# Patient Record
Sex: MALE
[De-identification: names, ages, dates, MRNs, and addresses within clinical notes are randomized per-mention and may not be internally consistent; named-entity substitution may affect disease eponyms.]

---

## 2017-02-03 ENCOUNTER — APPOINTMENT (OUTPATIENT)
Dept: CARDIOLOGY | Facility: CLINIC | Age: 52
End: 2017-02-03

## 2017-03-02 ENCOUNTER — TRANSCRIPTION ENCOUNTER (OUTPATIENT)
Age: 52
End: 2017-03-02

## 2017-12-20 ENCOUNTER — RX RENEWAL (OUTPATIENT)
Age: 52
End: 2017-12-20

## 2018-03-25 ENCOUNTER — RX RENEWAL (OUTPATIENT)
Age: 53
End: 2018-03-25

## 2019-01-17 ENCOUNTER — RX RENEWAL (OUTPATIENT)
Age: 54
End: 2019-01-17

## 2019-04-23 ENCOUNTER — RX RENEWAL (OUTPATIENT)
Age: 54
End: 2019-04-23

## 2019-04-23 RX ORDER — LOSARTAN POTASSIUM AND HYDROCHLOROTHIAZIDE 12.5; 1 MG/1; MG/1
100-12.5 TABLET ORAL
Qty: 90 | Refills: 0 | Status: ACTIVE | COMMUNITY
Start: 2017-12-20 | End: 1900-01-01

## 2025-06-03 ENCOUNTER — APPOINTMENT (OUTPATIENT)
Dept: ELECTROPHYSIOLOGY | Facility: CLINIC | Age: 60
End: 2025-06-03

## 2025-06-03 ENCOUNTER — NON-APPOINTMENT (OUTPATIENT)
Age: 60
End: 2025-06-03

## 2025-06-03 VITALS
HEART RATE: 51 BPM | HEIGHT: 71 IN | SYSTOLIC BLOOD PRESSURE: 147 MMHG | BODY MASS INDEX: 35.84 KG/M2 | DIASTOLIC BLOOD PRESSURE: 66 MMHG | WEIGHT: 256 LBS | OXYGEN SATURATION: 98 %

## 2025-06-03 DIAGNOSIS — E11.9 TYPE 2 DIABETES MELLITUS W/OUT COMPLICATIONS: ICD-10-CM

## 2025-06-03 DIAGNOSIS — I44.2 ATRIOVENTRICULAR BLOCK, COMPLETE: ICD-10-CM

## 2025-06-03 DIAGNOSIS — G47.30 SLEEP APNEA, UNSPECIFIED: ICD-10-CM

## 2025-06-03 PROCEDURE — G2211 COMPLEX E/M VISIT ADD ON: CPT | Mod: NC

## 2025-06-03 PROCEDURE — 99203 OFFICE O/P NEW LOW 30 MIN: CPT

## 2025-06-03 PROCEDURE — 93000 ELECTROCARDIOGRAM COMPLETE: CPT

## 2025-06-03 RX ORDER — OLMESARTAN MEDOXOMIL 40 MG/1
40 TABLET, FILM COATED ORAL DAILY
Refills: 0 | Status: ACTIVE | COMMUNITY

## 2025-06-03 RX ORDER — GLIPIZIDE 10 MG/1
10 TABLET ORAL DAILY
Refills: 0 | Status: ACTIVE | COMMUNITY

## 2025-06-03 RX ORDER — MAGNESIUM OXIDE/MAG AA CHELATE 300 MG
CAPSULE ORAL
Refills: 0 | Status: ACTIVE | COMMUNITY

## 2025-06-03 RX ORDER — CA/D3/MAG OX/ZINC/COP/MANG/BOR 600 MG-800
250 MCG TABLET,CHEWABLE ORAL
Refills: 0 | Status: ACTIVE | COMMUNITY

## 2025-06-04 ENCOUNTER — LABORATORY RESULT (OUTPATIENT)
Age: 60
End: 2025-06-04

## 2025-06-04 DIAGNOSIS — Z86.79 PERSONAL HISTORY OF OTHER DISEASES OF THE CIRCULATORY SYSTEM: ICD-10-CM

## 2025-06-04 DIAGNOSIS — Z83.3 FAMILY HISTORY OF DIABETES MELLITUS: ICD-10-CM

## 2025-06-04 DIAGNOSIS — Z82.49 FAMILY HISTORY OF ISCHEMIC HEART DISEASE AND OTHER DISEASES OF THE CIRCULATORY SYSTEM: ICD-10-CM

## 2025-06-25 ENCOUNTER — RESULT REVIEW (OUTPATIENT)
Age: 60
End: 2025-06-25

## 2025-06-25 ENCOUNTER — OUTPATIENT (OUTPATIENT)
Dept: OUTPATIENT SERVICES | Facility: HOSPITAL | Age: 60
LOS: 1 days | End: 2025-06-25
Payer: COMMERCIAL

## 2025-06-25 ENCOUNTER — TRANSCRIPTION ENCOUNTER (OUTPATIENT)
Age: 60
End: 2025-06-25

## 2025-06-25 VITALS
TEMPERATURE: 98 F | WEIGHT: 260.81 LBS | HEART RATE: 43 BPM | OXYGEN SATURATION: 100 % | SYSTOLIC BLOOD PRESSURE: 152 MMHG | RESPIRATION RATE: 16 BRPM | DIASTOLIC BLOOD PRESSURE: 61 MMHG | HEIGHT: 70 IN

## 2025-06-25 DIAGNOSIS — Z01.818 ENCOUNTER FOR OTHER PREPROCEDURAL EXAMINATION: ICD-10-CM

## 2025-06-25 DIAGNOSIS — I44.2 ATRIOVENTRICULAR BLOCK, COMPLETE: ICD-10-CM

## 2025-06-25 DIAGNOSIS — E78.5 HYPERLIPIDEMIA, UNSPECIFIED: Chronic | ICD-10-CM

## 2025-06-25 DIAGNOSIS — I10 ESSENTIAL (PRIMARY) HYPERTENSION: Chronic | ICD-10-CM

## 2025-06-25 DIAGNOSIS — Z98.890 OTHER SPECIFIED POSTPROCEDURAL STATES: Chronic | ICD-10-CM

## 2025-06-25 DIAGNOSIS — E66.9 OBESITY, UNSPECIFIED: Chronic | ICD-10-CM

## 2025-06-25 LAB
A1C WITH ESTIMATED AVERAGE GLUCOSE RESULT: 6.6 % — HIGH (ref 4–5.6)
ANION GAP SERPL CALC-SCNC: 5 MMOL/L — SIGNIFICANT CHANGE UP (ref 5–17)
BASOPHILS # BLD AUTO: 0.07 K/UL — SIGNIFICANT CHANGE UP (ref 0–0.2)
BASOPHILS NFR BLD AUTO: 0.9 % — SIGNIFICANT CHANGE UP (ref 0–2)
BLD GP AB SCN SERPL QL: SIGNIFICANT CHANGE UP
BUN SERPL-MCNC: 18 MG/DL — SIGNIFICANT CHANGE UP (ref 7–23)
CALCIUM SERPL-MCNC: 9.9 MG/DL — SIGNIFICANT CHANGE UP (ref 8.5–10.1)
CHLORIDE SERPL-SCNC: 104 MMOL/L — SIGNIFICANT CHANGE UP (ref 96–108)
CO2 SERPL-SCNC: 28 MMOL/L — SIGNIFICANT CHANGE UP (ref 22–31)
CREAT SERPL-MCNC: 1.09 MG/DL — SIGNIFICANT CHANGE UP (ref 0.5–1.3)
EGFR: 79 ML/MIN/1.73M2 — SIGNIFICANT CHANGE UP
EGFR: 79 ML/MIN/1.73M2 — SIGNIFICANT CHANGE UP
EOSINOPHIL # BLD AUTO: 0.23 K/UL — SIGNIFICANT CHANGE UP (ref 0–0.5)
EOSINOPHIL NFR BLD AUTO: 3 % — SIGNIFICANT CHANGE UP (ref 0–6)
ESTIMATED AVERAGE GLUCOSE: 143 MG/DL — HIGH (ref 68–114)
GLUCOSE SERPL-MCNC: 119 MG/DL — HIGH (ref 70–99)
HCT VFR BLD CALC: 45.1 % — SIGNIFICANT CHANGE UP (ref 39–50)
HGB BLD-MCNC: 14.8 G/DL — SIGNIFICANT CHANGE UP (ref 13–17)
IMM GRANULOCYTES # BLD AUTO: 0.02 K/UL — SIGNIFICANT CHANGE UP (ref 0–0.07)
IMM GRANULOCYTES NFR BLD AUTO: 0.3 % — SIGNIFICANT CHANGE UP (ref 0–0.9)
LYMPHOCYTES # BLD AUTO: 3.59 K/UL — HIGH (ref 1–3.3)
LYMPHOCYTES NFR BLD AUTO: 46.9 % — HIGH (ref 13–44)
MCHC RBC-ENTMCNC: 30 PG — SIGNIFICANT CHANGE UP (ref 27–34)
MCHC RBC-ENTMCNC: 32.8 G/DL — SIGNIFICANT CHANGE UP (ref 32–36)
MCV RBC AUTO: 91.3 FL — SIGNIFICANT CHANGE UP (ref 80–100)
MONOCYTES # BLD AUTO: 0.88 K/UL — SIGNIFICANT CHANGE UP (ref 0–0.9)
MONOCYTES NFR BLD AUTO: 11.5 % — SIGNIFICANT CHANGE UP (ref 2–14)
MRSA PCR RESULT.: SIGNIFICANT CHANGE UP
NEUTROPHILS # BLD AUTO: 2.87 K/UL — SIGNIFICANT CHANGE UP (ref 1.8–7.4)
NEUTROPHILS NFR BLD AUTO: 37.4 % — LOW (ref 43–77)
NRBC # BLD AUTO: 0 K/UL — SIGNIFICANT CHANGE UP (ref 0–0)
NRBC # FLD: 0 K/UL — SIGNIFICANT CHANGE UP (ref 0–0)
NRBC BLD AUTO-RTO: 0 /100 WBCS — SIGNIFICANT CHANGE UP (ref 0–0)
PLATELET # BLD AUTO: 300 K/UL — SIGNIFICANT CHANGE UP (ref 150–400)
PMV BLD: 9.6 FL — SIGNIFICANT CHANGE UP (ref 7–13)
POTASSIUM SERPL-MCNC: 3.9 MMOL/L — SIGNIFICANT CHANGE UP (ref 3.5–5.3)
POTASSIUM SERPL-SCNC: 3.9 MMOL/L — SIGNIFICANT CHANGE UP (ref 3.5–5.3)
RBC # BLD: 4.94 M/UL — SIGNIFICANT CHANGE UP (ref 4.2–5.8)
RBC # FLD: 13 % — SIGNIFICANT CHANGE UP (ref 10.3–14.5)
S AUREUS DNA NOSE QL NAA+PROBE: SIGNIFICANT CHANGE UP
SODIUM SERPL-SCNC: 137 MMOL/L — SIGNIFICANT CHANGE UP (ref 135–145)
WBC # BLD: 7.66 K/UL — SIGNIFICANT CHANGE UP (ref 3.8–10.5)
WBC # FLD AUTO: 7.66 K/UL — SIGNIFICANT CHANGE UP (ref 3.8–10.5)

## 2025-06-25 PROCEDURE — 87640 STAPH A DNA AMP PROBE: CPT

## 2025-06-25 PROCEDURE — 99214 OFFICE O/P EST MOD 30 MIN: CPT | Mod: 25

## 2025-06-25 PROCEDURE — 83036 HEMOGLOBIN GLYCOSYLATED A1C: CPT

## 2025-06-25 PROCEDURE — 86901 BLOOD TYPING SEROLOGIC RH(D): CPT

## 2025-06-25 PROCEDURE — 36415 COLL VENOUS BLD VENIPUNCTURE: CPT

## 2025-06-25 PROCEDURE — 93010 ELECTROCARDIOGRAM REPORT: CPT

## 2025-06-25 PROCEDURE — 71046 X-RAY EXAM CHEST 2 VIEWS: CPT

## 2025-06-25 PROCEDURE — 85025 COMPLETE CBC W/AUTO DIFF WBC: CPT

## 2025-06-25 PROCEDURE — 80048 BASIC METABOLIC PNL TOTAL CA: CPT

## 2025-06-25 PROCEDURE — 93005 ELECTROCARDIOGRAM TRACING: CPT

## 2025-06-25 PROCEDURE — 86850 RBC ANTIBODY SCREEN: CPT

## 2025-06-25 PROCEDURE — 71046 X-RAY EXAM CHEST 2 VIEWS: CPT | Mod: 26

## 2025-06-25 PROCEDURE — 86900 BLOOD TYPING SEROLOGIC ABO: CPT

## 2025-06-25 PROCEDURE — 87641 MR-STAPH DNA AMP PROBE: CPT

## 2025-06-25 NOTE — H&P PST ADULT - ASSESSMENT
1. EPS department to give written instructions to patient regarding medication management .  2. EPS booking will call patient the day before procedure regarding patient arrival time the day of surgery.  3. Instructed patient to have responsible adult to drive patient home if being discharged same day.  4. EZ wash and mupirocin instructions explained to patient.   5. NPO post midnight  59 year old male PMH significant of T2DM, HTN, HLD, obesity, CHRISTOPHER; Diagnosed with complete heart block on routine EKG; denies syncope, SOB, CP, palpitations; c/o HENLEY on exertion; he presents to PST for planned PPM implant     1. EPS department to give written instructions to patient regarding medication management .  2. EPS booking will call patient the day before procedure regarding patient arrival time the day of surgery.  3. Instructed patient to have responsible adult to drive patient home if being discharged same day.  4. EZ wash and mupirocin instructions explained to patient.   5. NPO post midnight

## 2025-06-25 NOTE — H&P PST ADULT - ADMIT DATE
----- Message from Jillian Robertson sent at 8/16/2018  4:17 PM CDT -----  Contact: Paula (home health nurse) @ [phone]  Calling to inform Dr. Moss that the patient does not need home health aide. Please call.   25-Jun-2025

## 2025-06-25 NOTE — H&P PST ADULT - HISTORY OF PRESENT ILLNESS
59 year old male PMH significant of T2DM, HTN, HLD, obesity, CHRISTOPHER; Diagnosed with complete heart block on routine EKG; denies syncope, SOB, CP, palpitations; c/o HENLEY on exertion; he presents to PST for planned PPM implant

## 2025-06-25 NOTE — H&P PST ADULT - PATIENT ON (OXYGEN DELIVERY METHOD)
Referred by: RAMA Jenkins; Medical Diagnosis (from order):    Diagnosis Information      Diagnosis    719.41 (ICD-9-CM) - M25.511 (ICD-10-CM) - Acute pain of right shoulder                Occupational Therapy -  Daily Treatment Note    Visit:  20   Next referring provider appointment: 2/20/2020        SUBJECTIVE                                                                                                             Patient continues to report pain with exercises and lower pain at rest.  Continues to fear losing his position.   Functional Change: Notes no change in function. Notes some stiffness this morning making dressing more difficult but not stiff yesterday.      Pain / Symptoms:  Pain rating (out of 10): Current: 2     OBJECTIVE                                                                                                                          TREATMENT                                                                                                                  Therapeutic Exercise:  UBC: 5 minutes  Supine wand flexion, abduction: 2# wand, 10x each  Supine Serratus punch up at 90 degrees: 15x w/2# wand 120: 15x w/2# wand  Supine bench: 2# wand, 10x  Supine ER/IR on half foam roll at 90 degrees flexion: 2# wand, 10x  Wall pushups with scapula push: 10x  Wt. Ball on wall flexion: green ball 20x cw  20x ccw  Wt. Ball on wall scaption: green ball 20x cw  20ccw  Wt. Ball on wall: rolling flexion: green ball 20x  Supine Ws: 10x  Bent over 1-3-5: 10x's    Prone scapular squeeze: 10x  Isometrics: shoulder all planes  Manual Therapy:  ER/IR contract relax concurrent with moist heat  PROM flexion/abduction  Moist Heat (52129)    Location: Anterior right shoulder  Position: supine  Temperature: 120° F  Duration: 10 minutes  Results: decreased pain and improved range of motion    Skilled input: verbal instruction/cues    Home Exercise Program: (*above indicates provided as part of home exercise  program)  Promoted increased functional tasks; lighter tasks      ASSESSMENT                                                                                                             Continue to work on progressing ROM and strengthening with gravity eliminated planes. Patient continues to have pain with any ROM and strengthening. Had most pain with supine bench pressing with only 2# wand and difficulty maintaining form of abduction to 90 degrees on RUE.      Pain/symptoms after session: 8 and 7  Patient Education:   Results of above outlined education: Verbalizes understanding and Demonstrates understanding     PLAN                                                                                                                             Suggestions for next session as indicated: ROM, stretching, scapular strength/stretch/stabilization, light strengthening       Procedures and total treatment time documented Time Entry flowsheet.     room air

## 2025-06-25 NOTE — H&P PST ADULT - NSICDXPASTMEDICALHX_GEN_ALL_CORE_FT
PAST MEDICAL HISTORY:  Complete heart block     HLD (hyperlipidemia)     HTN (hypertension)     Obese     CHRISTOPHER (obstructive sleep apnea)     Type 2 diabetes mellitus

## 2025-06-26 DIAGNOSIS — Z01.818 ENCOUNTER FOR OTHER PREPROCEDURAL EXAMINATION: ICD-10-CM

## 2025-06-26 DIAGNOSIS — I44.2 ATRIOVENTRICULAR BLOCK, COMPLETE: ICD-10-CM

## 2025-07-02 ENCOUNTER — OUTPATIENT (OUTPATIENT)
Dept: OUTPATIENT SERVICES | Facility: HOSPITAL | Age: 60
LOS: 1 days | Discharge: HOME | End: 2025-07-02
Payer: COMMERCIAL

## 2025-07-02 ENCOUNTER — RESULT REVIEW (OUTPATIENT)
Age: 60
End: 2025-07-02

## 2025-07-02 VITALS
OXYGEN SATURATION: 100 % | HEART RATE: 46 BPM | TEMPERATURE: 98 F | DIASTOLIC BLOOD PRESSURE: 66 MMHG | RESPIRATION RATE: 13 BRPM | HEIGHT: 70 IN | WEIGHT: 255.96 LBS | SYSTOLIC BLOOD PRESSURE: 165 MMHG

## 2025-07-02 DIAGNOSIS — I44.2 ATRIOVENTRICULAR BLOCK, COMPLETE: ICD-10-CM

## 2025-07-02 DIAGNOSIS — Z98.890 OTHER SPECIFIED POSTPROCEDURAL STATES: Chronic | ICD-10-CM

## 2025-07-02 LAB — GLUCOSE BLDC GLUCOMTR-MCNC: 104 MG/DL — HIGH (ref 70–99)

## 2025-07-02 PROCEDURE — C1898: CPT

## 2025-07-02 PROCEDURE — 80048 BASIC METABOLIC PNL TOTAL CA: CPT

## 2025-07-02 PROCEDURE — 71045 X-RAY EXAM CHEST 1 VIEW: CPT

## 2025-07-02 PROCEDURE — 82962 GLUCOSE BLOOD TEST: CPT

## 2025-07-02 PROCEDURE — C1887: CPT

## 2025-07-02 PROCEDURE — 33228 REMV&REPLC PM GEN DUAL LEAD: CPT

## 2025-07-02 PROCEDURE — 93010 ELECTROCARDIOGRAM REPORT: CPT

## 2025-07-02 PROCEDURE — 93005 ELECTROCARDIOGRAM TRACING: CPT

## 2025-07-02 PROCEDURE — C1894: CPT

## 2025-07-02 PROCEDURE — 71045 X-RAY EXAM CHEST 1 VIEW: CPT | Mod: 26

## 2025-07-02 PROCEDURE — C1769: CPT

## 2025-07-02 PROCEDURE — 33208 INSRT HEART PM ATRIAL & VENT: CPT

## 2025-07-02 PROCEDURE — C1785: CPT

## 2025-07-02 PROCEDURE — 36415 COLL VENOUS BLD VENIPUNCTURE: CPT

## 2025-07-02 RX ORDER — ATORVASTATIN CALCIUM 80 MG/1
10 TABLET, FILM COATED ORAL AT BEDTIME
Refills: 0 | Status: DISCONTINUED | OUTPATIENT
Start: 2025-07-02 | End: 2025-07-03

## 2025-07-02 RX ORDER — CEFAZOLIN SODIUM IN 0.9 % NACL 3 G/100 ML
1000 INTRAVENOUS SOLUTION, PIGGYBACK (ML) INTRAVENOUS EVERY 8 HOURS
Refills: 0 | Status: COMPLETED | OUTPATIENT
Start: 2025-07-02 | End: 2025-07-03

## 2025-07-02 RX ORDER — DEXTROSE 50 % IN WATER 50 %
15 SYRINGE (ML) INTRAVENOUS ONCE
Refills: 0 | Status: DISCONTINUED | OUTPATIENT
Start: 2025-07-02 | End: 2025-07-03

## 2025-07-02 RX ORDER — DEXTROSE 50 % IN WATER 50 %
25 SYRINGE (ML) INTRAVENOUS ONCE
Refills: 0 | Status: DISCONTINUED | OUTPATIENT
Start: 2025-07-02 | End: 2025-07-03

## 2025-07-02 RX ORDER — ATORVASTATIN CALCIUM 80 MG/1
1 TABLET, FILM COATED ORAL
Refills: 0 | DISCHARGE

## 2025-07-02 RX ORDER — GLUCAGON 3 MG/1
1 POWDER NASAL ONCE
Refills: 0 | Status: DISCONTINUED | OUTPATIENT
Start: 2025-07-02 | End: 2025-07-03

## 2025-07-02 RX ORDER — ACETAMINOPHEN 500 MG/5ML
650 LIQUID (ML) ORAL EVERY 6 HOURS
Refills: 0 | Status: DISCONTINUED | OUTPATIENT
Start: 2025-07-02 | End: 2025-07-03

## 2025-07-02 RX ORDER — DEXTROSE 50 % IN WATER 50 %
12.5 SYRINGE (ML) INTRAVENOUS ONCE
Refills: 0 | Status: DISCONTINUED | OUTPATIENT
Start: 2025-07-02 | End: 2025-07-03

## 2025-07-02 RX ORDER — SODIUM CHLORIDE 9 G/1000ML
1000 INJECTION, SOLUTION INTRAVENOUS
Refills: 0 | Status: DISCONTINUED | OUTPATIENT
Start: 2025-07-02 | End: 2025-07-03

## 2025-07-02 RX ORDER — INSULIN LISPRO 100 U/ML
INJECTION, SOLUTION INTRAVENOUS; SUBCUTANEOUS
Refills: 0 | Status: DISCONTINUED | OUTPATIENT
Start: 2025-07-02 | End: 2025-07-03

## 2025-07-02 RX ORDER — ASPIRIN 325 MG
1 TABLET ORAL
Refills: 0 | DISCHARGE

## 2025-07-02 RX ORDER — ASPIRIN 325 MG
81 TABLET ORAL DAILY
Refills: 0 | Status: DISCONTINUED | OUTPATIENT
Start: 2025-07-02 | End: 2025-07-03

## 2025-07-02 RX ORDER — LOSARTAN POTASSIUM 100 MG/1
100 TABLET, FILM COATED ORAL DAILY
Refills: 0 | Status: DISCONTINUED | OUTPATIENT
Start: 2025-07-02 | End: 2025-07-03

## 2025-07-02 RX ORDER — CYST/ALA/Q10/PHOS.SER/DHA/BROC 100-20-50
0 POWDER (GRAM) ORAL
Refills: 0 | DISCHARGE

## 2025-07-02 RX ORDER — OLMESARTAN MEDOXOMIL AND HYDROCHLOROTHIAZIDE 40; 12.5 MG/1; MG/1
1 TABLET ORAL
Refills: 0 | DISCHARGE

## 2025-07-02 RX ORDER — OXYCODONE HYDROCHLORIDE 30 MG/1
5 TABLET ORAL ONCE
Refills: 0 | Status: DISCONTINUED | OUTPATIENT
Start: 2025-07-02 | End: 2025-07-02

## 2025-07-02 RX ADMIN — OXYCODONE HYDROCHLORIDE 5 MILLIGRAM(S): 30 TABLET ORAL at 20:18

## 2025-07-02 RX ADMIN — Medication 650 MILLIGRAM(S): at 18:36

## 2025-07-02 RX ADMIN — ATORVASTATIN CALCIUM 10 MILLIGRAM(S): 80 TABLET, FILM COATED ORAL at 21:20

## 2025-07-02 RX ADMIN — Medication 1000 MILLIGRAM(S): at 18:37

## 2025-07-02 NOTE — PROCEDURE NOTE - ADDITIONAL PROCEDURE DETAILS
STAT CXR/ EKG  pressure dressing for 24hrs  observe ontele overnight  CBC/BMP in am  postop abx prophylaxis with ancef 1g q8hrs x 2doses  outpatient follow up in EP clinic in 2 weeks

## 2025-07-02 NOTE — PACU DISCHARGE NOTE - COMMENTS
Report Given to Inga WHITTAKER on 3N , pt discharged sofia stretcher, telemetry maintained, pt family directed to pt room, safety and comfort maintained.

## 2025-07-03 ENCOUNTER — TRANSCRIPTION ENCOUNTER (OUTPATIENT)
Age: 60
End: 2025-07-03

## 2025-07-03 VITALS
TEMPERATURE: 98 F | RESPIRATION RATE: 18 BRPM | OXYGEN SATURATION: 95 % | DIASTOLIC BLOOD PRESSURE: 87 MMHG | SYSTOLIC BLOOD PRESSURE: 158 MMHG | HEART RATE: 73 BPM

## 2025-07-03 DIAGNOSIS — I44.2 ATRIOVENTRICULAR BLOCK, COMPLETE: ICD-10-CM

## 2025-07-03 LAB
ANION GAP SERPL CALC-SCNC: 6 MMOL/L — SIGNIFICANT CHANGE UP (ref 5–17)
BUN SERPL-MCNC: 15 MG/DL — SIGNIFICANT CHANGE UP (ref 7–23)
CALCIUM SERPL-MCNC: 9.2 MG/DL — SIGNIFICANT CHANGE UP (ref 8.5–10.1)
CHLORIDE SERPL-SCNC: 106 MMOL/L — SIGNIFICANT CHANGE UP (ref 96–108)
CO2 SERPL-SCNC: 25 MMOL/L — SIGNIFICANT CHANGE UP (ref 22–31)
CREAT SERPL-MCNC: 0.92 MG/DL — SIGNIFICANT CHANGE UP (ref 0.5–1.3)
EGFR: 96 ML/MIN/1.73M2 — SIGNIFICANT CHANGE UP
EGFR: 96 ML/MIN/1.73M2 — SIGNIFICANT CHANGE UP
GLUCOSE BLDC GLUCOMTR-MCNC: 129 MG/DL — HIGH (ref 70–99)
GLUCOSE SERPL-MCNC: 129 MG/DL — HIGH (ref 70–99)
POTASSIUM SERPL-MCNC: 3.9 MMOL/L — SIGNIFICANT CHANGE UP (ref 3.5–5.3)
POTASSIUM SERPL-SCNC: 3.9 MMOL/L — SIGNIFICANT CHANGE UP (ref 3.5–5.3)
SODIUM SERPL-SCNC: 137 MMOL/L — SIGNIFICANT CHANGE UP (ref 135–145)

## 2025-07-03 PROCEDURE — 93010 ELECTROCARDIOGRAM REPORT: CPT

## 2025-07-03 RX ORDER — ACETAMINOPHEN 500 MG/5ML
2 LIQUID (ML) ORAL
Qty: 0 | Refills: 0 | DISCHARGE
Start: 2025-07-03

## 2025-07-03 RX ADMIN — LOSARTAN POTASSIUM 100 MILLIGRAM(S): 100 TABLET, FILM COATED ORAL at 09:30

## 2025-07-03 RX ADMIN — Medication 1000 MILLIGRAM(S): at 02:57

## 2025-07-03 RX ADMIN — Medication 650 MILLIGRAM(S): at 08:47

## 2025-07-03 RX ADMIN — Medication 81 MILLIGRAM(S): at 09:31

## 2025-07-03 NOTE — DISCHARGE NOTE NURSING/CASE MANAGEMENT/SOCIAL WORK - FINANCIAL ASSISTANCE
[facility] provides services at a reduced cost to those who are determined to be eligible through [facility]’s financial assistance program. Information regarding [facility]’s financial assistance program can be found by going to [email or web address] or by calling [phone].

## 2025-07-03 NOTE — DISCHARGE NOTE PROVIDER - NSDCMRMEDTOKEN_GEN_ALL_CORE_FT
acetaminophen 325 mg oral tablet: 2 tab(s) orally every 6 hours As needed Moderate Pain (4 - 6)  aspirin 81 mg oral delayed release tablet: 1 tab(s) orally once a day  atorvastatin 80 mg oral tablet: 1 tab(s) orally once a day  cholecalciferol-menaquinone:   GlipiZIDE XL 10 mg oral tablet, extended release: 1 tab(s) orally once a day  Magnesium:   olmesartan-hydrochlorothiazide 40 mg-12.5 mg oral tablet: 1 tab(s) orally once a day

## 2025-07-03 NOTE — DISCHARGE NOTE PROVIDER - NSDCFUSCHEDAPPT_GEN_ALL_CORE_FT
[facility] Physician [address]  Scheduled Appointment: 07/16/2025

## 2025-07-03 NOTE — DISCHARGE NOTE NURSING/CASE MANAGEMENT/SOCIAL WORK - PATIENT PORTAL LINK FT
You can access the FollowMyHealth Patient Portal offered by [facility] by registering at the following website: [email or web address]. By joining [facility]’s FollowMyHealth portal, you will also be able to view your health information using other applications (apps) compatible with our system.

## 2025-07-03 NOTE — DISCHARGE NOTE PROVIDER - HOSPITAL COURSE
This is a 59 yr old male with PMHx of HTN, HLD,DM, CHRISTOPHER (intermittent compliance with CPAP) who had intermittent high degree AV block/CHB on outpatient cardiac monitor and he reported fatigue.  He was not on any AV rosa agents and was referred for PPM implant.  He underwent cardiac MRI revealing LVEF 58% and focal myocardial scar.    25:  s/p dual chamber PPM implant, POD #1. Pt seen sitting up in bed in NAD, he denies any c/o CP, SOB, palpitations or dizziness, he has ambulated without difficulty.  TELE: atrial sensing with ventricular pacing, 60-70 bpm, underlying Mobitz Type II HB  EK/3/25: AS/ 70 bpm, [id number], QRS 152ms, QT/QTc 450/486ms    MEDICATIONS  (STANDING):  aspirin enteric coated 81 milliGRAM(s) Oral daily  atorvastatin 10 milliGRAM(s) Oral at bedtime  dextrose 5%. 1000 milliLiter(s) (50 mL/Hr) IV Continuous <Continuous>  dextrose 5%. 1000 milliLiter(s) (100 mL/Hr) IV Continuous <Continuous>  dextrose 50% Injectable 25 Gram(s) IV Push once  dextrose 50% Injectable 12.5 Gram(s) IV Push once  dextrose 50% Injectable 25 Gram(s) IV Push once  glucagon  Injectable 1 milliGRAM(s) IntraMuscular once  hydrochlorothiazide 12.5 milliGRAM(s) Oral daily  insulin lispro (ADMELOG) corrective regimen sliding scale   SubCutaneous three times a day before meals  losartan 100 milliGRAM(s) Oral daily    MEDICATIONS  (PRN):  acetaminophen     Tablet .. 650 milliGRAM(s) Oral every 6 hours PRN Moderate Pain (4 - 6)  dextrose Oral Gel 15 Gram(s) Oral once PRN Blood Glucose LESS THAN 70 milliGRAM(s)/deciliter    Allergies  No Known Allergies    Vital Signs Last 24 Hrs  T(C): 36.9 (2025 08:33), Max: 36.9 (2025 16:55)  T(F): 98.5 (2025 08:33), Max: 98.5 (2025 16:55)  HR: 73 (2025 08:33) (46 - 83)  BP: 158/87 (2025 08:33) (130/79 - 175/87)  BP(mean): --  RR: 18 (2025 08:33) (13 - 19)  SpO2: 95% (2025 08:33) (95% - 100%)    Parameters below as of 2025 08:33  Patient On (Oxygen Delivery Method): room air    REVIEW OF SYSTEMS:    CONSTITUTIONAL: No weakness, fevers or chills  EYES/ENT: No visual changes;  No vertigo or throat pain   NECK: No pain or stiffness  RESPIRATORY: No cough, wheezing, hemoptysis; No shortness of breath  CARDIOVASCULAR: No chest pain or palpitations  GASTROINTESTINAL: No abdominal or epigastric pain. No nausea, vomiting, or hematemesis; No diarrhea or constipation. No melena or hematochezia.  GENITOURINARY: No dysuria, frequency or hematuria  NEUROLOGICAL: No numbness or weakness  SKIN: No itching, burning, rashes, or lesions   All other review of systems is negative unless indicated above    PHYSICAL EXAM:    General: WN/WD NAD  Neurology: A&Ox3, nonfocal, GARCIA x 4  HEENT: NC/AT, neck supple, no JVD, EOMI, PERRL  Respiratory: CTA B/L  CV: RRR, S1S2, no murmurs, rubs or gallops  Abdominal: Soft, NT, ND +BS  Extremities: No edema, + peripheral pulses  Skin: No rashes.  Left chest wall incision is C/D/I with Prineo dressing, no drainage or hematoma.    < from: Xray Chest 1 View- PORTABLE-Urgent (25 @ 13:06) >  IMPRESSION:  1. AV sequential pacemaker in situ, without evidence of complication.  2. Clear lungs with no acute cardiopulmonary abnormalities.    < from: Cardiac Rhythm Management (25 @ 10:59) >    Conclusions   1. Successful implantation of a Medtronic dual chamber pacemaker with  deep septal/left bundle area pacing.        A/P: s/p dual chamber PPM implant with LBB pacing lead POD #1 for high degree AV block.  Post op instructions reviewed with pt who verbalized his understanding.  Follow up as outpatient in 2 weeks at [facility] for f/u wound check, appointment given.  Resume home medications.  Device interrogation this am reveals normal function  Medtronic [place] XT DR, mode DDD @ 60 bpm  Lead impedances and thresholds are WNL.  Stable for discharge to home.               This is a 59 yr old male with PMHx of HTN, HLD,DM, CHRISTOPHER (intermittent compliance with CPAP) who had intermittent high degree AV block/CHB on outpatient cardiac monitor and he reported fatigue.  He was not on any AV rosa agents and was referred for PPM implant.  He underwent cardiac MRI revealing LVEF 58% and focal myocardial scar.    25:  s/p dual chamber PPM implant, POD #1. Pt seen sitting up in bed in NAD, he denies any c/o CP, SOB, palpitations or dizziness, he has ambulated without difficulty.  TELE: atrial sensing with ventricular pacing, 60-70 bpm, underlying Mobitz Type II HB  EK/3/25: AS/ 70 bpm, [id number], QRS 152ms, QT/QTc 450/486ms    MEDICATIONS  (STANDING):  aspirin enteric coated 81 milliGRAM(s) Oral daily  atorvastatin 10 milliGRAM(s) Oral at bedtime  dextrose 5%. 1000 milliLiter(s) (50 mL/Hr) IV Continuous <Continuous>  dextrose 5%. 1000 milliLiter(s) (100 mL/Hr) IV Continuous <Continuous>  dextrose 50% Injectable 25 Gram(s) IV Push once  dextrose 50% Injectable 12.5 Gram(s) IV Push once  dextrose 50% Injectable 25 Gram(s) IV Push once  glucagon  Injectable 1 milliGRAM(s) IntraMuscular once  hydrochlorothiazide 12.5 milliGRAM(s) Oral daily  insulin lispro (ADMELOG) corrective regimen sliding scale   SubCutaneous three times a day before meals  losartan 100 milliGRAM(s) Oral daily    MEDICATIONS  (PRN):  acetaminophen     Tablet .. 650 milliGRAM(s) Oral every 6 hours PRN Moderate Pain (4 - 6)  dextrose Oral Gel 15 Gram(s) Oral once PRN Blood Glucose LESS THAN 70 milliGRAM(s)/deciliter    Allergies  No Known Allergies    Vital Signs Last 24 Hrs  T(C): 36.9 (2025 08:33), Max: 36.9 (2025 16:55)  T(F): 98.5 (2025 08:33), Max: 98.5 (2025 16:55)  HR: 73 (2025 08:33) (46 - 83)  BP: 158/87 (2025 08:33) (130/79 - 175/87)  BP(mean): --  RR: 18 (2025 08:33) (13 - 19)  SpO2: 95% (2025 08:33) (95% - 100%)    Parameters below as of 2025 08:33  Patient On (Oxygen Delivery Method): room air        137  |  106  |  15  ----------------------------<  129[H]  3.9   |  25  |  0.92    Ca    9.2      2025 08:07    REVIEW OF SYSTEMS:    CONSTITUTIONAL: No weakness, fevers or chills  EYES/ENT: No visual changes;  No vertigo or throat pain   NECK: No pain or stiffness  RESPIRATORY: No cough, wheezing, hemoptysis; No shortness of breath  CARDIOVASCULAR: No chest pain or palpitations  GASTROINTESTINAL: No abdominal or epigastric pain. No nausea, vomiting, or hematemesis; No diarrhea or constipation. No melena or hematochezia.  GENITOURINARY: No dysuria, frequency or hematuria  NEUROLOGICAL: No numbness or weakness  SKIN: No itching, burning, rashes, or lesions   All other review of systems is negative unless indicated above    PHYSICAL EXAM:    General: WN/WD NAD  Neurology: A&Ox3, nonfocal, GARCIA x 4  HEENT: NC/AT, neck supple, no JVD, EOMI, PERRL  Respiratory: CTA B/L  CV: RRR, S1S2, no murmurs, rubs or gallops  Abdominal: Soft, NT, ND +BS  Extremities: No edema, + peripheral pulses  Skin: No rashes.  Left chest wall incision is C/D/I with Prineo dressing, no drainage or hematoma.    < from: Xray Chest 1 View- PORTABLE-Urgent (25 @ 13:06) >  IMPRESSION:  1. AV sequential pacemaker in situ, without evidence of complication.  2. Clear lungs with no acute cardiopulmonary abnormalities.    < from: Cardiac Rhythm Management (25 @ 10:59) >    Conclusions   1. Successful implantation of a Medtronic dual chamber pacemaker with  deep septal/left bundle area pacing.      A/P: s/p dual chamber PPM implant with LBB pacing lead POD #1 for high degree AV block.  Post op instructions reviewed with pt who verbalized his understanding.  Follow up as outpatient in 2 weeks at [facility] for f/u wound check, appointment given.  Resume home medications.  Device interrogation this am reveals normal function  Medtronic [place] XT DR, mode DDD @ 60 bpm  Lead impedances and thresholds are WNL.  Stable for discharge to home.

## 2025-07-03 NOTE — DISCHARGE NOTE PROVIDER - NSDCCPCAREPLAN_GEN_ALL_CORE_FT
PRINCIPAL DISCHARGE DIAGNOSIS  Diagnosis: Complete heart block  Assessment and Plan of Treatment: s/p dual chamber PPM implant

## 2025-07-03 NOTE — DISCHARGE NOTE PROVIDER - CARE PROVIDER_API CALL
Eve Lange  Cardiovascular Disease  [address] [place] [address]  Phone: [phone]  Fax: [phone]  Established Patient  Follow Up Time:

## 2025-07-16 ENCOUNTER — APPOINTMENT (OUTPATIENT)
Dept: ELECTROPHYSIOLOGY | Facility: CLINIC | Age: 60
End: 2025-07-16